# Patient Record
Sex: FEMALE | Race: WHITE | NOT HISPANIC OR LATINO | URBAN - NONMETROPOLITAN AREA
[De-identification: names, ages, dates, MRNs, and addresses within clinical notes are randomized per-mention and may not be internally consistent; named-entity substitution may affect disease eponyms.]

---

## 2020-05-08 ENCOUNTER — TRANSCRIBE ORDERS (OUTPATIENT)
Dept: ADMINISTRATIVE | Facility: HOSPITAL | Age: 45
End: 2020-05-08

## 2020-05-08 DIAGNOSIS — N92.1 METRORRHAGIA: Primary | ICD-10-CM

## 2020-05-15 ENCOUNTER — HOSPITAL ENCOUNTER (OUTPATIENT)
Dept: ULTRASOUND IMAGING | Facility: HOSPITAL | Age: 45
Discharge: HOME | End: 2020-05-15
Admitting: FAMILY MEDICINE

## 2020-05-15 DIAGNOSIS — N92.1 METRORRHAGIA: ICD-10-CM

## 2020-05-15 PROCEDURE — 76830 TRANSVAGINAL US NON-OB: CPT

## 2020-05-22 PROBLEM — N84.0 ENDOMETRIAL POLYP: Status: ACTIVE | Noted: 2020-05-22

## 2020-05-22 PROBLEM — Z01.419 WELL WOMAN EXAM: Status: ACTIVE | Noted: 2020-05-22

## 2020-05-22 PROBLEM — N83.201 RIGHT OVARIAN CYST: Status: ACTIVE | Noted: 2020-05-22

## 2020-07-08 ENCOUNTER — OFFICE VISIT (OUTPATIENT)
Dept: ENDOCRINOLOGY | Facility: CLINIC | Age: 45
End: 2020-07-08

## 2020-07-08 VITALS
SYSTOLIC BLOOD PRESSURE: 118 MMHG | OXYGEN SATURATION: 98 % | HEIGHT: 70 IN | BODY MASS INDEX: 24.94 KG/M2 | WEIGHT: 174.2 LBS | DIASTOLIC BLOOD PRESSURE: 72 MMHG | HEART RATE: 61 BPM

## 2020-07-08 DIAGNOSIS — R79.89 ELEVATED TSH: Primary | ICD-10-CM

## 2020-07-08 PROCEDURE — 99244 OFF/OP CNSLTJ NEW/EST MOD 40: CPT | Performed by: INTERNAL MEDICINE

## 2020-07-08 RX ORDER — FLUTICASONE PROPIONATE 50 MCG
2 SPRAY, SUSPENSION (ML) NASAL AS NEEDED
COMMUNITY

## 2020-07-08 RX ORDER — EPINEPHRINE 0.3 MG/.3ML
INJECTION INTRAMUSCULAR SEE ADMIN INSTRUCTIONS
COMMUNITY
Start: 2020-05-08

## 2020-07-08 RX ORDER — LEVOTHYROXINE SODIUM 0.05 MG/1
50 TABLET ORAL DAILY
Qty: 30 TABLET | Refills: 3 | Status: SHIPPED | OUTPATIENT
Start: 2020-07-08 | End: 2020-09-11 | Stop reason: SDUPTHER

## 2020-07-08 NOTE — PROGRESS NOTES
Chief Complaint   Patient presents with   • Establish Care   • Elevated TSH        New patient who is being seen in consultation regarding elevated TSH at the request of Gabby Short MD     HPI   Mitra García is a 45 y.o. female who presents for evaluation of elevated TSH.    Patient presents today for evaluation of elevated TSH which was diagnosed in May 2020 when patient presented for routine labs. Patient denies known history of thyroid dysfunction prior to this. Patient is not currently on medication for this.     Patient denies palpitations. She does report some anxiety.  Patient reports changes in bowel habits. Patient reports constipation.   Patient reports cold intolerance for most of her adult life.  Patient reports changes in weight. Patient reports she has been exercising without ability to lose weight.  Patient  hair, skin or nail changes.  Patient denies tremor.  Patient reports decreased energy level.    Patient denies history of previous head/neck radiation.  Patient denies history of recent iodine exposure.  Patient denies taking OTC supplements such as biotin.  Patient denies personal or family history of thyroid cancer.     Periods over the past few months with increased blood flow during menses with longer cycles.  Patient undergoing evaluation with GYN.    Past Medical History:   Diagnosis Date   • Arthritis    • Bilateral ovarian cysts    • Frequent UTI    • Thyroid disease      Past Surgical History:   Procedure Laterality Date   • KNEE ACL RECONSTRUCTION     • WISDOM TOOTH EXTRACTION        Family History   Problem Relation Age of Onset   • Arthritis Mother    • Mental illness Mother    • Obesity Mother    • Osteoporosis Mother    • Thyroid disease Mother    • Arthritis Father    • Cancer Father    • Cancer Brother    • Mental illness Brother    • Migraines Brother    • Cancer Maternal Aunt    • Obesity Maternal Aunt    • Arthritis Maternal Grandmother    • Hypertension Maternal  Grandmother    • Arthritis Maternal Grandfather    • Heart attack Maternal Grandfather    • Hypertension Maternal Grandfather    • Liver disease Maternal Grandfather    • Arthritis Paternal Grandmother    • Hypertension Paternal Grandmother    • Stroke Paternal Grandmother    • Arthritis Paternal Grandfather    • Hypertension Paternal Grandfather    • Stroke Paternal Grandfather       Social History     Socioeconomic History   • Marital status:      Spouse name: Not on file   • Number of children: Not on file   • Years of education: Not on file   • Highest education level: Not on file   Tobacco Use   • Smoking status: Never Smoker   • Smokeless tobacco: Never Used   Substance and Sexual Activity   • Alcohol use: Not Currently   • Drug use: Never   • Sexual activity: Defer      Allergies   Allergen Reactions   • Morphine Shortness Of Breath      Current Outpatient Medications on File Prior to Visit   Medication Sig Dispense Refill   • EPIPEN 2-NICO 0.3 MG/0.3ML solution auto-injector injection See Admin Instructions.     • fluticasone (FLONASE) 50 MCG/ACT nasal spray 2 sprays into the nostril(s) as directed by provider As Needed for Allergies.     • Multiple Vitamins-Minerals (MULTIVITAMIN ADULT PO) Take 1 tablet by mouth Daily.       No current facility-administered medications on file prior to visit.         Review of Systems   Constitutional: Positive for fatigue and unexpected weight gain.   HENT: Negative for trouble swallowing and voice change.    Eyes: Positive for visual disturbance. Negative for pain.   Respiratory: Negative for cough and shortness of breath.    Cardiovascular: Negative for chest pain and palpitations.   Gastrointestinal: Positive for constipation. Negative for diarrhea.   Endocrine: Positive for cold intolerance. Negative for heat intolerance.   Genitourinary: Positive for menstrual problem.   Musculoskeletal: Negative for arthralgias and myalgias.   Skin: Positive for dry skin.  "Negative for pallor.   Neurological: Negative for tremors and headache.   Psychiatric/Behavioral: Positive for sleep disturbance. The patient is nervous/anxious.        Vitals:    07/08/20 0842   BP: 118/72   Pulse: 61   SpO2: 98%   Weight: 79 kg (174 lb 3.2 oz)   Height: 177.8 cm (70\")   Body mass index is 25 kg/m².     Physical Exam   Constitutional: Vital signs are normal. She appears well-developed and well-nourished. She is cooperative.   HENT:   Head: Normocephalic and atraumatic.   Right Ear: Hearing normal.   Left Ear: Hearing normal.   Nose: Nose normal.   Eyes: Pupils are equal, round, and reactive to light. Conjunctivae and EOM are normal.   Neck: Trachea normal. No thyromegaly present.   Cardiovascular: Normal rate, regular rhythm and normal pulses.   No murmur heard.  Pulmonary/Chest: Effort normal and breath sounds normal. No respiratory distress.   Abdominal: Soft. Bowel sounds are normal. She exhibits no distension. There is no hepatosplenomegaly. There is no tenderness.   Lymphadenopathy:        Head (right side): No submandibular adenopathy present.        Head (left side): No submandibular adenopathy present.     She has no cervical adenopathy.   Neurological: She is alert. She has normal strength and normal reflexes.   Skin: Skin is warm, dry and intact. No rash noted.   Psychiatric: She has a normal mood and affect. Her behavior is normal.   Vitals reviewed.         Labs/Imaging  Labs dated 5/8/2020  Free T4 1.3, reference range 0.82-1.77  TSH 5.4, reference range 0.45-4.5  TPO antibody 413, reference range 0-30    Labs dated March 29, 2018  TSH 4.37    Labs dated May 19, 2014  TPO antibody 799    Labs dated February 1, 2017  Free T3 3.9  Free T4 1.17  TSH 2.72    Labs dated May 14, 2015  Free T3-4  Free T4 1.12  TSH 3.14    Labs dated May 19, 2014  Free T3 3.8  Free T4 1.25  Thyroglobulin antibody 141  TSH 3.69    Labs dated September 5, 2013  TSH 5.99    Labs dated April 3, 2009  Free T4 " 1.03  TSH 2.03    Assessment and Plan    Mitra was seen today for establish care and elevated tsh.    Diagnoses and all orders for this visit:    Elevated TSH  -Patient with intermittent elevation in TSH, normal free hormones over the last several years, consistent with subclinical hyperthyroidism  -TPO antibodies elevated  -Discussed with patient that her symptoms are nonspecific and the possibility that they may not improve with thyroid hormone.   - discussed potential side effects of levothyroxine, specifically of hyperthyroidism  - will plan to start levothyroxine 50 mcg daily  - appropriate administration of medication reviewed  - discussed symptoms of both hyperthyroidism and hypothyroidism in detail, patient to contact the clinic in the interim between appointments with any concerning changes  - patient given lab orders to repeat TFTs in 2 months  -     TSH; Future  -     T4, Free; Future  -     levothyroxine (Synthroid) 50 MCG tablet; Take 1 tablet by mouth Daily. On empty stomach    Patient was given orders for labs to be completed outside of [facility]. Patient instructed to contact clinic if they have not heard from this office regarding results within 1 week of lab draw to ensure that results were received by this office.    Return in about 4 months (around 11/8/2020) for Next scheduled follow up. The patient was instructed to contact the clinic with any interval questions or concerns.    Chelo Colmenares MD     Please note that portions of this document were completed using a voice recognition program. Efforts were made to edit the dictations, but occasionally words are mis-transcribed.

## 2020-08-19 ENCOUNTER — CLINICAL SUPPORT (OUTPATIENT)
Dept: GENETICS | Facility: HOSPITAL | Age: 45
End: 2020-08-19

## 2020-08-19 DIAGNOSIS — Z80.49 FAMILY HISTORY OF UTERINE CANCER: ICD-10-CM

## 2020-08-19 DIAGNOSIS — Z80.3 FAMILY HISTORY OF BREAST CANCER: Primary | ICD-10-CM

## 2020-08-21 NOTE — PROGRESS NOTES
Addendum: 2/15/22:  Saliva sample was not sent back to [facility] and genetic testing has not been completed at this time.  Patient is welcome to contact our office if she would like to proceed with testing in the future.        Mitra García is a 45-year-old female who was referred for genetic counseling due to a family history of colon cancer. Genetic counseling was provided via telephone.  Ms. García has no personal history of cancer. She is premenopausal and retains her uterus and ovaries.  She has not had a colonoscopy.  She was interested in discussing her risk for a hereditary cancer syndrome. Ms. Friends was interested in pursuing a multi-gene panel, and therefore the CancerNext panel was ordered through [facility] which analyzes 36 genes associated with an increased cancer risk. The genes on this panel include APC, TANIKA, AXIN2, BARD1, BMPR1A, BRCA1, BRCA2, BRIP1, CDH1, CDK4, CDKN2A, CHEK2, DICER1, EPCAM, GREM1, HOXB13, MLH1, MRE11A, MSH2, MSH3, MSH6, MUTYH, NBN, NF1, NTHL1, PALB2, PMS2, POLD1, POLE, PTEN, RAD51C, RAD51D, RECQL, SMAD4, SMARCA4, STK11, and TP53. A saliva sample collection kit is being mailed to Ms. García. Results from this testing are expected in approximately 2-3 weeks.    FAMILY HISTORY (see attached pedigree):    Mother:  Uterine cancer, 46  Mat. Uncle:  Glioblastoma  Mat. Aunt:  Breast cancer, 66  Mat. Great-Aunt: Breast cancer, 70s  Mat. Grandmother: Leukemia  Father:   Prostate cancer, 65  Brother:  Wilms tumor, 3      We do not have medical records confirming the diagnoses in Ms. Friends’ family.  RISK ASSESSMENT: Ms. Friends’ family history led to concern regarding a hereditary cancer syndrome. Testing for Alvarenga syndrome testing was discussed given the history of her mother with early onset uterine cancer. In cases where an affected relative is not available for testing, it is appropriate to offer testing to an unaffected individual. Ms. García opted to pursue multigene  panel testing via the CancerNext panel. This would also allow for evaluation of the BRCA1/2 genes and a number of other hereditary cancer genes. If genetic testing is negative, Ms. García’ management should be guided by family history. These risk assessments are based on the family history information provided at the time of the appointment and could change in the future should new information be obtained.    GENETIC COUNSELING:  We reviewed the family history information in detail. Cases of cancer follow three general patterns: sporadic, familial, and hereditary.  While most cancer is sporadic, some cases appear to occur in family clusters.  These cases are said to be familial and account for 10-20% of cancer cases.  Familial cases may be due to a combination of shared genes and environmental factors among family members.  In even fewer families, the cancer is said to be inherited, and the genes responsible for the cancer are known.      Family histories typical of hereditary cancer syndromes usually include multiple first- and second-degree relatives diagnosed with cancer types that define a syndrome. These cases tend to be diagnosed at younger-than-expected ages and can be bilateral or multifocal. The cancer in these families follows an autosomal dominant inheritance pattern, which indicates the likely presence of a mutation in a cancer susceptibility gene. Children and siblings of an individual believed to carry this mutation have a 50% chance of inheriting that mutation, thereby inheriting the increased risk to develop cancer. These mutations can be passed down from the maternal or the paternal lineage.    We discussed that Alvarenga syndrome is the most common condition related to a hereditary risk for uterine cancer.  Alvarenga syndrome is a hereditary colon cancer syndrome that is caused by mutations in the mismatch repair genes. The lifetime risk for colon cancer for individuals with Alvarenga syndrome is as high as  80% if there is no intervention (i.e. removal of polyps detected on colonoscopy).  Other risks include endometrial cancer (up to 60%), as well as other cancers (ovarian, upper GI, brain, stomach, pancreatic). There are national management guidelines that have been established for individuals known to have Alvarenga syndrome.      We discussed there are other genes associated with increased cancer risk. Some of these conditions have well defined cancer risks and established management guidelines.  We discussed the BRCA1/2 genes based on the family history of breast cancer.  Other genes that can be tested for have been more recently described, and there may be less data regarding the risks and therefore may not have established management guidelines.  We discussed these limitations at length. Based on Ms. García’ family history of cancer and her desire to get more information regarding her personal risks she opted to pursue testing through a panel evaluating several other genes known to increase the risk for cancer.    GENETIC TESTING:  The risks, benefits and limitations of genetic testing and implications for clinical management following testing were reviewed. DNA test results can influence decisions regarding screening and prevention.  Genetic testing can have significant psychological implications for both individuals and families. Also discussed was the possibility of employment and insurance discrimination based on genetic test results and the federal and states laws that are in place to prevent this ([place]).         We discussed panel testing, which would involve testing 36 genes associated with increased cancer risk, including the genes associated with Alvarenga syndrome and the BRCA1/2 genes. The benefits and limitations of genetic testing were discussed. The implications of a positive or negative test result were discussed. We also discussed the importance of testing on an affected relative. We discussed the  possibility that, in some cases, genetic test results may be ambiguous due to the identification of a genetic variant. These variants may or may not be associated with an increased cancer risk. With multigene panel testing, it is not uncommon for a variant of uncertain significance (VUS) to be identified.  If a VUS is identified, testing family members is not recommended and screening recommendations are made based on the family history. The laboratories that perform genetic testing work to reclassify the VUS and send out an amended report if and when a VUS is reclassified.  The majority of variant findings are ultimately reclassified to a negative result. Given her family history, a negative test result does not eliminate all cancer risk, although the risk would not be as high as it would with positive genetic testing. We also discussed that some of the genes on this particular panel have not been well studied yet and there may not be clear implications or guidelines for some of the genes included on this comprehensive panel.    PLAN:  Genetic testing via the CancerNext panel through [facility] was ordered and results are expected in 2-3 weeks. We will contact Ms. Ricky with her results once they are received.      Vianney Camara MS, [facility] [place]  Licensed Certified Genetic Counselor

## 2020-09-09 DIAGNOSIS — R79.89 ELEVATED TSH: ICD-10-CM

## 2020-09-10 LAB
T4 FREE SERPL-MCNC: 1.78 NG/DL (ref 0.82–1.77)
TSH SERPL DL<=0.005 MIU/L-ACNC: 1.61 UIU/ML (ref 0.45–4.5)

## 2020-09-11 RX ORDER — LEVOTHYROXINE SODIUM 0.05 MG/1
50 TABLET ORAL DAILY
Qty: 30 TABLET | Refills: 5 | Status: SHIPPED | OUTPATIENT
Start: 2020-09-11 | End: 2020-11-12

## 2020-11-09 ENCOUNTER — OFFICE VISIT (OUTPATIENT)
Dept: ENDOCRINOLOGY | Facility: CLINIC | Age: 45
End: 2020-11-09

## 2020-11-09 VITALS
SYSTOLIC BLOOD PRESSURE: 118 MMHG | BODY MASS INDEX: 25.91 KG/M2 | HEART RATE: 54 BPM | DIASTOLIC BLOOD PRESSURE: 68 MMHG | OXYGEN SATURATION: 100 % | WEIGHT: 181 LBS | HEIGHT: 70 IN

## 2020-11-09 DIAGNOSIS — R79.89 ELEVATED TSH: Primary | ICD-10-CM

## 2020-11-09 PROCEDURE — 99213 OFFICE O/P EST LOW 20 MIN: CPT | Performed by: INTERNAL MEDICINE

## 2020-11-09 NOTE — PROGRESS NOTES
"Chief Complaint   Patient presents with   • Follow-up   • Elevated TSH        HPI   Mitra García is a 45 y.o. female had concerns including Follow-up and Elevated TSH.      Patient reports no significant changes in her health or symptomatic concerns in the interim since her last visit other than eyelid swelling which is new.  She does report a history of allergies and is going to be seeing an allergist in the near future.  She reports continuation of itchy/dry skin with development of hives on occasion.  She also reports weight gain of approximately 11 pounds, constipation and continued anxiety.  She does report that she believes a portion of her weight gain is attributed to muscle she is started working out at [facility]. She reports that none of the symptoms are changed significantly since her last visit.    Patient continues on levothyroxine 50 mcg daily for subclinical hypothyroidism.  She denies any issues with this medication.  She does report potentially a single missed dose since her last visit.  She takes this first thing in the morning on empty stomach.  She denies any worsening anxiety.  She reports continued constipation, weight gain as above.    The following portions of the patient's history were reviewed and updated as appropriate: allergies, current medications and past social history.    Review of Systems   Constitutional: Positive for fatigue and unexpected weight gain.   Cardiovascular: Negative for chest pain and palpitations.   Gastrointestinal: Positive for constipation. Negative for abdominal pain.   Psychiatric/Behavioral: The patient is nervous/anxious.         /68   Pulse 54   Ht 177.8 cm (70\")   Wt 82.1 kg (181 lb)   SpO2 100%   BMI 25.97 kg/m²      Physical Exam      Constitutional:  well developed; well nourished  no acute distress   ENT/Thyroid: not examined   Eyes: Conjunctiva: clear   Respiratory:  breathing is unlabored  clear to auscultation bilaterally "   Cardiovascular:  regular rate and rhythm   Chest:  Not performed.   Abdomen: soft, non-tender; no masses   : Not performed.   Musculoskeletal: Not performed   Skin: not performed.   Neuro: normal without focal findings and mental status, speech normal   Psych: mood and affect are within normal limits       Labs/Imaging  Results for CANDI HILL (MRN [id number]) as of 11/9/2020 10:56   Ref. Range 9/9/2020 09:19   TSH Baseline Latest Ref Range: 0.450 - 4.500 uIU/mL 1.610   Free T4 Latest Ref Range: 0.82 - 1.77 ng/dL 1.78 (H)       Diagnoses and all orders for this visit:    1. Elevated TSH (Primary)  -Patient with subclinical hypothyroidism  -Patient currently taking levothyroxine 50 mcg daily, TSH 1.61 in September 2020  -Reviewed appropriate administration of medication.  -Reviewed the patient's symptomatic concerns, discussed that symptoms are nonspecific and could have multiple potential contributing factors.  -Discussed with patient for continued symptoms in the setting of a normal TSH suggest her symptoms are likely multifactorial and are not likely to resolve completely with titration of thyroid hormone.  However, if she desires addition of a low-dose of T3 or increasing levothyroxine we can trial this so long as her TSH does not become suppressed.  Patient denies any plans for future pregnancy, any coronary artery disease or history of cardiac arrhythmias.  -We will repeat thyroid function testing today  -Could consider slight increase in levothyroxine versus addition of T3 should TSH allow.  -     TSH  -     T4, Free      Return in about 4 months (around 3/9/2021). The patient was instructed to contact the clinic with any interval questions or concerns.    Chelo Colmenares MD   Endocrinologist    Please note that portions of this document were completed with a voice recognition program. Efforts were made to edit the dictations, but occasionally words are mis-transcribed.

## 2020-11-12 DIAGNOSIS — R79.89 ELEVATED TSH: ICD-10-CM

## 2020-11-12 RX ORDER — LEVOTHYROXINE SODIUM 0.05 MG/1
TABLET ORAL
Qty: 26 TABLET | Refills: 5 | Status: SHIPPED | OUTPATIENT
Start: 2020-11-12 | End: 2020-12-24

## 2020-11-20 DIAGNOSIS — R79.89 ELEVATED TSH: Primary | ICD-10-CM

## 2020-12-24 DIAGNOSIS — R79.89 ELEVATED TSH: ICD-10-CM

## 2020-12-24 RX ORDER — LEVOTHYROXINE SODIUM 0.03 MG/1
TABLET ORAL
Qty: 30 TABLET | Refills: 5 | Status: SHIPPED | OUTPATIENT
Start: 2020-12-24 | End: 2021-04-01 | Stop reason: SDUPTHER

## 2021-03-09 ENCOUNTER — OFFICE VISIT (OUTPATIENT)
Dept: ENDOCRINOLOGY | Facility: CLINIC | Age: 46
End: 2021-03-09

## 2021-03-09 VITALS
DIASTOLIC BLOOD PRESSURE: 72 MMHG | WEIGHT: 177 LBS | SYSTOLIC BLOOD PRESSURE: 124 MMHG | HEIGHT: 70 IN | BODY MASS INDEX: 25.34 KG/M2 | HEART RATE: 68 BPM | OXYGEN SATURATION: 98 %

## 2021-03-09 DIAGNOSIS — R76.8 ANTI-TPO ANTIBODIES PRESENT: ICD-10-CM

## 2021-03-09 DIAGNOSIS — R79.89 ELEVATED TSH: Primary | ICD-10-CM

## 2021-03-09 PROCEDURE — 99214 OFFICE O/P EST MOD 30 MIN: CPT | Performed by: INTERNAL MEDICINE

## 2021-03-09 NOTE — PROGRESS NOTES
"Chief Complaint   Patient presents with   • Follow-up   • Elevated TSH        HPI   Mitra García is a 45 y.o. female had concerns including Follow-up and Elevated TSH.      Levothyroxine dose reduced in the interim since last visit. Patient currently taking 25 mcg daily. She does report intermittent heat intolerance. She has had some intentional weight gain. She denies any palpitations or concerning changes in bowel habits. She has been taking her biotin supplementation but is agreeable to holding off and repeating labs in 1 week.    The following portions of the patient's history were reviewed and updated as appropriate: allergies, current medications and past social history.    Review of Systems   Respiratory: Negative for cough.    Cardiovascular: Negative for palpitations.   Gastrointestinal: Negative for constipation and diarrhea.   Endocrine: Positive for heat intolerance.      /72   Pulse 68   Ht 177.8 cm (70\")   Wt 80.3 kg (177 lb)   SpO2 98%   BMI 25.40 kg/m²      Physical Exam      Constitutional:  well developed; well nourished  no acute distress  appears stated age   ENT/Thyroid: not examined   Eyes: Conjunctiva: clear   Respiratory:  breathing is unlabored  clear to auscultation bilaterally   Cardiovascular:  regular rate and rhythm   Chest:  Not performed.   Abdomen: soft, non-tender; no masses   : Not performed.   Musculoskeletal: Not performed   Skin: dry and warm   Neuro: mental status, speech normal   Psych: mood and affect are within normal limits       Labs/Imaging  Labs dated 12/22/2020  TSH 0.02  Free T4 1.75    Diagnoses and all orders for this visit:    1. Elevated TSH (Primary)  -Started on levothyroxine in July 2020 due to subclinical hypothyroidism with symptoms  -Patient has recently required reduction in thyroid hormone secondary to iatrogenic hyperthyroidism  -Patient currently taking levothyroxine 25 mcg daily, most recent TSH obtained between visits 0.02, not at " goal, patient reports heat intolerance  -Continue levothyroxine 25 mcg daily for now, repeat thyroid function tests in 1 week after patient held biotin supplementation, patient given orders to complete locally  -Reviewed symptoms of both hypothyroidism and hyperthyroidism instructed the patient to contact the clinic in the interim between visits with any concerning changes.  -Discussed options of continuation of thyroid hormone versus discontinuation if TSH normal. Given elevated TPO antibodies, would lean towards continuation of thyroid hormone at low-dose should TSH allow.  -     TSH; Future  -     T4, Free; Future    2. Anti-TPO antibodies present  -Elevated on testing in May 2020  -Discussed that TPO antibodies are a risk factor for development of overt hypothyroidism in the future. Discussed that patient should have at least annual thyroid testing even if asymptomatic.      Return in about 4 months (around 7/9/2021) for Next scheduled follow up. The patient was instructed to contact the clinic with any interval questions or concerns.    Chelo Colmenares MD   Endocrinologist    Please note that portions of this document were completed with a voice recognition program. Efforts were made to edit the dictations, but occasionally words are mis-transcribed.

## 2021-03-30 DIAGNOSIS — R79.89 ELEVATED TSH: ICD-10-CM

## 2021-03-31 LAB
T4 FREE SERPL-MCNC: 1.18 NG/DL (ref 0.82–1.77)
TSH SERPL DL<=0.005 MIU/L-ACNC: 7.99 UIU/ML (ref 0.45–4.5)

## 2021-04-01 RX ORDER — LEVOTHYROXINE SODIUM 0.03 MG/1
TABLET ORAL
Qty: 45 TABLET | Refills: 5 | Status: SHIPPED | OUTPATIENT
Start: 2021-04-01 | End: 2021-06-25

## 2021-06-25 DIAGNOSIS — R79.89 ELEVATED TSH: ICD-10-CM

## 2021-06-25 RX ORDER — LEVOTHYROXINE SODIUM 0.03 MG/1
TABLET ORAL
Qty: 45 TABLET | Refills: 5 | Status: SHIPPED | OUTPATIENT
Start: 2021-06-25 | End: 2021-07-12

## 2021-07-09 ENCOUNTER — OFFICE VISIT (OUTPATIENT)
Dept: ENDOCRINOLOGY | Facility: CLINIC | Age: 46
End: 2021-07-09

## 2021-07-09 VITALS
HEIGHT: 70 IN | DIASTOLIC BLOOD PRESSURE: 78 MMHG | OXYGEN SATURATION: 98 % | WEIGHT: 177.8 LBS | HEART RATE: 63 BPM | BODY MASS INDEX: 25.45 KG/M2 | SYSTOLIC BLOOD PRESSURE: 122 MMHG

## 2021-07-09 DIAGNOSIS — R79.89 ELEVATED TSH: ICD-10-CM

## 2021-07-09 DIAGNOSIS — E03.9 HYPOTHYROIDISM, UNSPECIFIED TYPE: Primary | ICD-10-CM

## 2021-07-09 PROCEDURE — 99213 OFFICE O/P EST LOW 20 MIN: CPT | Performed by: INTERNAL MEDICINE

## 2021-07-09 NOTE — PROGRESS NOTES
"Chief Complaint   Patient presents with   • Follow-up   • Elevated TSH        HPI   Candi Hill is a 46 y.o. female had concerns including Follow-up and Elevated TSH.      Patient reports no significant health changes in the interim since her last visit.  She is currently alternating levothyroxine 25 and 50 mcg daily.  She denies any significant weight changes, heat or cold intolerance.  She does report some intermittent constipation but states this is not significantly changed from previous.  She denies any missed doses of medications.    The following portions of the patient's history were reviewed and updated as appropriate: allergies, current medications and past medical history.    Review of Systems   Gastrointestinal: Positive for constipation. Negative for diarrhea.   Endocrine: Negative for cold intolerance and heat intolerance.       /78   Pulse 63   Ht 177.8 cm (70\")   Wt 80.6 kg (177 lb 12.8 oz)   SpO2 98%   BMI 25.51 kg/m²      Physical Exam      Constitutional:  well developed; well nourished  no acute distress  appears stated age   ENT/Thyroid: not examined   Eyes: Conjunctiva: clear   Respiratory:  breathing is unlabored  clear to auscultation bilaterally   Cardiovascular:  regular rate and rhythm   Chest:  Not performed.   Abdomen: soft, non-tender; no masses   : Not performed.   Musculoskeletal: Not performed   Skin: dry and warm   Neuro: mental status, speech normal   Psych: mood and affect are within normal limits       Labs/Imaging  Results for CANDI HILL (MRN [id number]) as of 7/9/2021 08:32   Ref. Range 3/30/2021 09:26   TSH Baseline Latest Ref Range: 0.450 - 4.500 uIU/mL 7.990 (H)   Free T4 Latest Ref Range: 0.82 - 1.77 ng/dL 1.18       Diagnoses and all orders for this visit:    1. Hypothyroidism, unspecified type (Primary)  -Currently taking alternating doses of 25 mcg and 50 mcg daily  -Clinically euthyroid  -Appropriate administration and storage of thyroid " hormone was reviewed  -Plan to repeat labs and adjust levothyroxine as clinically indicated  -Symptoms of both hypothyroidism and hyperthyroidism were reviewed with patient in detail and she was instructed to contact the clinic in interim between visits with any concerning changes.  -     TSH; Future  -     T4, Free; Future      Return in about 3 months (around 10/9/2021). The patient was instructed to contact the clinic with any interval questions or concerns.    Chelo Colmenares MD   Endocrinologist    Please note that portions of this document were completed with a voice recognition program. Efforts were made to edit the dictations, but occasionally words are mis-transcribed.

## 2021-07-10 LAB
T4 FREE SERPL-MCNC: 1.45 NG/DL (ref 0.82–1.77)
TSH SERPL DL<=0.005 MIU/L-ACNC: 5.59 UIU/ML (ref 0.45–4.5)

## 2021-07-12 RX ORDER — LEVOTHYROXINE SODIUM 0.05 MG/1
50 TABLET ORAL DAILY
Qty: 30 TABLET | Refills: 5 | Status: SHIPPED | OUTPATIENT
Start: 2021-07-12 | End: 2021-11-29

## 2021-10-19 ENCOUNTER — OFFICE VISIT (OUTPATIENT)
Dept: ENDOCRINOLOGY | Facility: CLINIC | Age: 46
End: 2021-10-19

## 2021-10-19 VITALS
BODY MASS INDEX: 26.05 KG/M2 | OXYGEN SATURATION: 100 % | HEART RATE: 48 BPM | HEIGHT: 70 IN | SYSTOLIC BLOOD PRESSURE: 124 MMHG | DIASTOLIC BLOOD PRESSURE: 74 MMHG | WEIGHT: 182 LBS

## 2021-10-19 DIAGNOSIS — E03.9 HYPOTHYROIDISM, UNSPECIFIED TYPE: Primary | ICD-10-CM

## 2021-10-19 PROCEDURE — 99213 OFFICE O/P EST LOW 20 MIN: CPT | Performed by: INTERNAL MEDICINE

## 2021-10-19 NOTE — PROGRESS NOTES
"Chief Complaint   Patient presents with   • Hypothyroidism        HPI   Candi Hill is a 46 y.o. female had concerns including Hypothyroidism.      Patient denies any significant health changes in the interim since her last visit.  She reports continued frustrations regarding inability to lose weight.  She does report that she continues to diet and exercise regularly.  She does report that she feels she has regained some muscle tone.  She denies any palpitations, diarrhea, constipation, heat or cold intolerance.  She is currently taking levothyroxine 50 mcg daily.    The following portions of the patient's history were reviewed and updated as appropriate: allergies, current medications and past social history.    Review of Systems   Constitutional: Positive for unexpected weight gain. Negative for fatigue.   Cardiovascular: Negative for palpitations.   Gastrointestinal: Negative for constipation and diarrhea.        /74 (BP Location: Right arm, Patient Position: Sitting, Cuff Size: Adult)   Pulse (!) 48   Ht 177.8 cm (70\")   Wt 82.6 kg (182 lb)   SpO2 100%   BMI 26.11 kg/m²    Manual pulse approximately 55, patient wearing acrylic nails    Physical Exam      Constitutional:  well developed; well nourished  no acute distress  appears stated age   ENT/Thyroid: no thyromegaly   Eyes: Conjunctiva: clear   Respiratory:  breathing is unlabored  clear to auscultation bilaterally   Cardiovascular:  regular rate and rhythm   Chest:  Not performed.   Abdomen: soft, non-tender; no masses   : Not performed.   Musculoskeletal: Not performed   Skin: dry and warm   Neuro: mental status, speech normal   Psych: mood and affect are within normal limits       Labs/Imaging  Results for CANDI HILL (MRN [id number]) as of 10/19/2021 08:43   Ref. Range 7/9/2021 09:23   TSH Baseline Latest Ref Range: 0.450 - 4.500 uIU/mL 5.590 (H)   Free T4 Latest Ref Range: 0.82 - 1.77 ng/dL 1.45       Diagnoses and all orders " for this visit:    1. Hypothyroidism, unspecified type (Primary)  -Most recent TSH above goal, levothyroxine increased to 50 mcg daily following this  -Patient's primary concern is inability to lose weight, otherwise clinically euthyroid  -Discussed other potential options for thyroid hormone replacement including addition of synthetic T3 versus desiccated thyroid hormone.  Patient denies any plans for pregnancy, any history of arrhythmia or coronary artery disease.  We did discuss potential cardiac stimulation with any T3-containing therapy.  Patient is potentially interested in adding T3 pending repeat labs.  -Continue levothyroxine 50 mcg daily for now  -Determine next step after review of labs  -     TSH; Future  -     T4, Free; Future      Return in about 3 months (around 1/19/2022). The patient was instructed to contact the clinic with any interval questions or concerns.    Chelo Colmenares MD   Endocrinologist    Please note that portions of this document were completed with a voice recognition program. Efforts were made to edit the dictations, but occasionally words are mis-transcribed.

## 2021-10-20 LAB
T4 FREE SERPL-MCNC: 1.45 NG/DL (ref 0.82–1.77)
TSH SERPL DL<=0.005 MIU/L-ACNC: 4.67 UIU/ML (ref 0.45–4.5)

## 2021-10-21 DIAGNOSIS — E03.9 HYPOTHYROIDISM, UNSPECIFIED TYPE: Primary | ICD-10-CM

## 2021-10-21 RX ORDER — LIOTHYRONINE SODIUM 5 UG/1
5 TABLET ORAL DAILY
Qty: 30 TABLET | Refills: 3 | Status: SHIPPED | OUTPATIENT
Start: 2021-10-21 | End: 2022-01-27

## 2021-11-28 DIAGNOSIS — R79.89 ELEVATED TSH: ICD-10-CM

## 2021-11-29 RX ORDER — LEVOTHYROXINE SODIUM 0.05 MG/1
TABLET ORAL
Qty: 78 TABLET | Refills: 1 | Status: SHIPPED | OUTPATIENT
Start: 2021-11-29 | End: 2022-01-27

## 2022-01-24 ENCOUNTER — TELEPHONE (OUTPATIENT)
Dept: ENDOCRINOLOGY | Facility: CLINIC | Age: 47
End: 2022-01-24

## 2022-01-24 DIAGNOSIS — E03.9 HYPOTHYROIDISM, UNSPECIFIED TYPE: ICD-10-CM

## 2022-01-24 DIAGNOSIS — R79.89 ELEVATED TSH: Primary | ICD-10-CM

## 2022-01-24 NOTE — TELEPHONE ENCOUNTER
Called pt and she is going to [employer] in [facility] phone number is [phone] and fax number [phone]. Orders faxed.

## 2022-01-24 NOTE — TELEPHONE ENCOUNTER
----- Message from Chelo Colmenares MD sent at 1/24/2022 11:40 AM EST -----  Regarding: FW: Labs?      ----- Message -----  From: Yamel Kurtz MA  Sent: 1/24/2022  11:37 AM EST  To: Chelo Colmenares MD  Subject: FW: Labs?                                        See message  ----- Message -----  From: Mitra García  Sent: 1/24/2022  10:50 AM EST  To: paty Alfaro [facility] [place]  Subject: Labs?                                            Flynn Wang!  Last time we talked about me getting my labs drawn before my appointment since I have to use [facility]. So my appointment is Thursday afternoon. If you could please send me my lab orders I could get them drawn tomorrow morning.    Hope you are well,  K

## 2022-01-26 LAB
T4 FREE SERPL-MCNC: 1.47 NG/DL (ref 0.82–1.77)
TSH SERPL-ACNC: 1.06 UIU/ML (ref 0.45–4.5)

## 2022-01-27 ENCOUNTER — OFFICE VISIT (OUTPATIENT)
Dept: ENDOCRINOLOGY | Facility: CLINIC | Age: 47
End: 2022-01-27

## 2022-01-27 VITALS
SYSTOLIC BLOOD PRESSURE: 120 MMHG | BODY MASS INDEX: 26.92 KG/M2 | HEIGHT: 70 IN | OXYGEN SATURATION: 98 % | HEART RATE: 66 BPM | DIASTOLIC BLOOD PRESSURE: 60 MMHG | WEIGHT: 188 LBS

## 2022-01-27 DIAGNOSIS — E03.9 HYPOTHYROIDISM, UNSPECIFIED TYPE: Primary | ICD-10-CM

## 2022-01-27 PROCEDURE — 99213 OFFICE O/P EST LOW 20 MIN: CPT | Performed by: INTERNAL MEDICINE

## 2022-01-27 RX ORDER — LIOTHYRONINE SODIUM 5 UG/1
5 TABLET ORAL DAILY
Qty: 90 TABLET | Refills: 1 | Status: SHIPPED | OUTPATIENT
Start: 2022-01-27 | End: 2022-08-16

## 2022-01-27 RX ORDER — LEVOTHYROXINE SODIUM 0.05 MG/1
50 TABLET ORAL DAILY
Qty: 90 TABLET | Refills: 1 | Status: SHIPPED | OUTPATIENT
Start: 2022-01-27 | End: 2023-01-03 | Stop reason: SDUPTHER

## 2022-01-27 NOTE — PROGRESS NOTES
"Chief Complaint   Patient presents with   • Hypothyroidism        HPI   Candi Hill is a 46 y.o. female had concerns including Hypothyroidism.     Patient denies any significant health changes in the interim since her last visit.  She does report increased stress at work as they are currently understaffed.  She does report that she feels like she has been more irritable recently.  She also reports fatigue but states she has not been sleeping well.  She did complete labs prior to appointment.  She is currently taking levothyroxine 50 mcg daily and liothyronine 5 mcg daily.  She denies missed doses of medication.    The following portions of the patient's history were reviewed and updated as appropriate: allergies, current medications and past social history.    Review of Systems   Constitutional: Positive for fatigue. Negative for unexpected weight gain.   Cardiovascular: Negative for palpitations.   Gastrointestinal: Negative for constipation and diarrhea.   Endocrine: Negative for cold intolerance and heat intolerance.   Psychiatric/Behavioral: Positive for agitation.      /60 (BP Location: Right arm, Patient Position: Sitting, Cuff Size: Adult)   Pulse 66   Ht 177.8 cm (70\")   Wt 85.3 kg (188 lb)   SpO2 98%   BMI 26.98 kg/m²      Physical Exam      Constitutional:  well developed; well nourished  no acute distress  appears stated age   ENT/Thyroid: no thyromegaly   Eyes: Conjunctiva: clear   Respiratory:  breathing is unlabored  clear to auscultation bilaterally   Cardiovascular:  regular rate and rhythm   Chest:  Not performed.   Abdomen: soft, non-tender; no masses   : Not performed.   Musculoskeletal: Not performed   Skin: dry and warm   Neuro: mental status, speech normal   Psych: mood and affect are within normal limits       Labs/Imaging  Results for CANDI HILL (MRN [id number]) as of 1/27/2022 15:43   Ref. Range 1/25/2022 10:56   TSH Baseline Latest Ref Range: 0.450 - 4.500 uIU/mL " 1.060   Free T4 Latest Ref Range: 0.82 - 1.77 ng/dL 1.47       Diagnoses and all orders for this visit:    1. Hypothyroidism, unspecified type (Primary)  -Thyroid function testing on January 25 with normal TSH, normal free T4  -Patient currently taking levothyroxine 50 mcg daily, liothyronine 5 mcg daily  -Reviewed with patient that in the setting of normal labs, is difficult to attribute her symptomatic concerns to thyroid hormone dysregulation.  She is aware that the symptoms are nonspecific.  Given this, will defer any changes to thyroid hormone at this time.  -Symptoms of both hypothyroidism and hyperthyroidism were reviewed with patient in detail she was instructed to contact clinic in the interim between visits with any concerning changes.  -     levothyroxine (SYNTHROID, LEVOTHROID) 50 MCG tablet; Take 1 tablet by mouth Daily.  Dispense: 90 tablet; Refill: 1  -     liothyronine (CYTOMEL) 5 MCG tablet; Take 1 tablet by mouth Daily.  Dispense: 90 tablet; Refill: 1  -     TSH; Future  -     T4, Free; Future      Return in about 5 months (around 6/27/2022) for Next scheduled follow up. The patient was instructed to contact the clinic with any interval questions or concerns.    Chelo Colmenares MD   Endocrinologist    Dictated Utilizing Dragon Dictation

## 2022-06-13 ENCOUNTER — TRANSCRIBE ORDERS (OUTPATIENT)
Dept: ADMINISTRATIVE | Facility: HOSPITAL | Age: 47
End: 2022-06-13

## 2022-06-13 DIAGNOSIS — Z12.31 SCREENING MAMMOGRAM FOR BREAST CANCER: Primary | ICD-10-CM

## 2022-06-18 LAB
25(OH)D3+25(OH)D2 SERPL-MCNC: 77.4 NG/ML (ref 30–100)
ALBUMIN SERPL-MCNC: 4.4 G/DL (ref 3.8–4.8)
ALBUMIN/GLOB SERPL: 1.5 {RATIO} (ref 1.2–2.2)
ALP SERPL-CCNC: 55 IU/L (ref 44–121)
ALT SERPL-CCNC: 13 IU/L (ref 0–32)
AMBIG ABBREV CMP14 DEFAULT: NORMAL
AMBIG ABBREV LP DEFAULT: NORMAL
AST SERPL-CCNC: 20 IU/L (ref 0–40)
BILIRUB SERPL-MCNC: 0.8 MG/DL (ref 0–1.2)
BUN SERPL-MCNC: 18 MG/DL (ref 6–24)
BUN/CREAT SERPL: 16 (ref 9–23)
CALCIUM SERPL-MCNC: 9.6 MG/DL (ref 8.7–10.2)
CHLORIDE SERPL-SCNC: 101 MMOL/L (ref 96–106)
CHOLEST SERPL-MCNC: 167 MG/DL (ref 100–199)
CO2 SERPL-SCNC: 25 MMOL/L (ref 20–29)
CREAT SERPL-MCNC: 1.11 MG/DL (ref 0.57–1)
EGFRCR SERPLBLD CKD-EPI 2021: 61 ML/MIN/1.73
ERYTHROCYTE [DISTWIDTH] IN BLOOD BY AUTOMATED COUNT: 12.1 % (ref 11.7–15.4)
GLOBULIN SER CALC-MCNC: 2.9 G/DL (ref 1.5–4.5)
GLUCOSE SERPL-MCNC: 89 MG/DL (ref 65–99)
HCT VFR BLD AUTO: 42 % (ref 34–46.6)
HDLC SERPL-MCNC: 61 MG/DL
HGB BLD-MCNC: 14.3 G/DL (ref 11.1–15.9)
LDLC SERPL CALC-MCNC: 86 MG/DL (ref 0–99)
MCH RBC QN AUTO: 31 PG (ref 26.6–33)
MCHC RBC AUTO-ENTMCNC: 34 G/DL (ref 31.5–35.7)
MCV RBC AUTO: 91 FL (ref 79–97)
PLATELET # BLD AUTO: 245 X10E3/UL (ref 150–450)
POTASSIUM SERPL-SCNC: 4.9 MMOL/L (ref 3.5–5.2)
PROT SERPL-MCNC: 7.3 G/DL (ref 6–8.5)
RBC # BLD AUTO: 4.62 X10E6/UL (ref 3.77–5.28)
SODIUM SERPL-SCNC: 140 MMOL/L (ref 134–144)
T4 FREE SERPL-MCNC: 1.3 NG/DL (ref 0.82–1.77)
TRIGL SERPL-MCNC: 113 MG/DL (ref 0–149)
TSH SERPL DL<=0.005 MIU/L-ACNC: 5.58 UIU/ML (ref 0.45–4.5)
VLDLC SERPL CALC-MCNC: 20 MG/DL (ref 5–40)
WBC # BLD AUTO: 5.2 X10E3/UL (ref 3.4–10.8)

## 2022-06-27 ENCOUNTER — OFFICE VISIT (OUTPATIENT)
Dept: ENDOCRINOLOGY | Facility: CLINIC | Age: 47
End: 2022-06-27

## 2022-06-27 VITALS
OXYGEN SATURATION: 98 % | SYSTOLIC BLOOD PRESSURE: 110 MMHG | BODY MASS INDEX: 25.62 KG/M2 | HEART RATE: 60 BPM | WEIGHT: 179 LBS | DIASTOLIC BLOOD PRESSURE: 60 MMHG | HEIGHT: 70 IN

## 2022-06-27 DIAGNOSIS — E03.9 HYPOTHYROIDISM, UNSPECIFIED TYPE: Primary | ICD-10-CM

## 2022-06-27 PROCEDURE — 99213 OFFICE O/P EST LOW 20 MIN: CPT | Performed by: INTERNAL MEDICINE

## 2022-06-27 NOTE — PROGRESS NOTES
"Chief Complaint   Patient presents with   • Hypothyroidism        HPI   Mitra García is a 47 y.o. female had concerns including Hypothyroidism.     Patient reports no significant health changes in the interim since her last visit.  She reports she has been doing generally well.  She has had some weight loss since last visit which is intentional.  She does report some intermittent constipation.  She does report she was surprised when she thought recent labs with elevated TSH.  Of note, she does believe she is taking biotin and may not have stopped this prior to testing.    The following portions of the patient's history were reviewed and updated as appropriate: allergies, current medications and past social history.    Review of Systems   Constitutional: Negative for unexpected weight gain and unexpected weight loss.   Gastrointestinal: Positive for constipation. Negative for diarrhea.   Endocrine: Negative for cold intolerance and heat intolerance.      /60 (BP Location: Right arm, Patient Position: Sitting, Cuff Size: Adult)   Pulse 60   Ht 177.8 cm (70\")   Wt 81.2 kg (179 lb)   SpO2 98%   BMI 25.68 kg/m²      Physical Exam      Constitutional:  well developed; well nourished  no acute distress   ENT/Thyroid: not examined   Eyes: Conjunctiva: clear   Respiratory:  breathing is unlabored  clear to auscultation bilaterally   Cardiovascular:  regular rate and rhythm   Chest:  Not performed.   Abdomen: soft, non-tender; no masses   : Not performed.   Musculoskeletal: Not performed   Skin: dry and warm   Neuro: mental status, speech normal   Psych: mood and affect are within normal limits       Labs/Imaging   Latest Reference Range & Units 06/17/22 08:46   TSH Baseline 0.450 - 4.500 uIU/mL 5.580 (H)   Free T4 0.82 - 1.77 ng/dL 1.30   (H): Data is abnormally high    Diagnoses and all orders for this visit:    1. Hypothyroidism, unspecified type (Primary)  -Currently taking levothyroxine 50 mcg daily, " liothyronine 5 mcg daily  -Recent labs with mild elevation of TSH, normal free T4  -Patient reports mild constipation, otherwise clinically euthyroid  -Patient reports she may not have discontinued biotin prior to recent testing.  She would like to defer changes and repeat labs in 6 weeks before making any adjustments.  -Appropriate administration of thyroid hormone reviewed  -Patient to repeat labs in 6 weeks, she will hold biotin 1 week prior.  -Symptoms of thyroid hormone dysregulation reviewed, patient will contact the clinic in the interim between visits with any concerning symptoms.  -     TSH; Future  -     T4, Free; Future         Return in about 5 months (around 11/27/2022) for Next scheduled follow up. The patient was instructed to contact the clinic with any interval questions or concerns.    Chelo Colmenares MD   Endocrinologist    Dictated Utilizing Dragon Dictation

## 2022-08-10 PROCEDURE — U0004 COV-19 TEST NON-CDC HGH THRU: HCPCS | Performed by: FAMILY MEDICINE

## 2022-08-15 DIAGNOSIS — E03.9 HYPOTHYROIDISM, UNSPECIFIED TYPE: ICD-10-CM

## 2022-08-16 RX ORDER — LIOTHYRONINE SODIUM 5 UG/1
5 TABLET ORAL DAILY
Qty: 90 TABLET | Refills: 1 | Status: SHIPPED | OUTPATIENT
Start: 2022-08-16 | End: 2023-01-03

## 2022-08-20 LAB
T4 FREE SERPL-MCNC: 1.46 NG/DL (ref 0.82–1.77)
TSH SERPL DL<=0.005 MIU/L-ACNC: 4.09 UIU/ML (ref 0.45–4.5)

## 2022-08-22 ENCOUNTER — HOSPITAL ENCOUNTER (OUTPATIENT)
Dept: MAMMOGRAPHY | Facility: HOSPITAL | Age: 47
Discharge: HOME | End: 2022-08-22
Admitting: FAMILY MEDICINE

## 2022-08-22 DIAGNOSIS — Z12.31 SCREENING MAMMOGRAM FOR BREAST CANCER: ICD-10-CM

## 2022-08-22 PROCEDURE — 77067 SCR MAMMO BI INCL CAD: CPT

## 2022-08-22 PROCEDURE — 77063 BREAST TOMOSYNTHESIS BI: CPT

## 2022-08-29 DIAGNOSIS — E03.9 HYPOTHYROIDISM, UNSPECIFIED TYPE: Primary | ICD-10-CM

## 2022-09-08 ENCOUNTER — HOSPITAL ENCOUNTER (OUTPATIENT)
Dept: GENERAL RADIOLOGY | Facility: HOSPITAL | Age: 47
Discharge: HOME | End: 2022-09-08
Admitting: FAMILY MEDICINE

## 2022-09-08 DIAGNOSIS — R10.31 RIGHT LOWER QUADRANT ABDOMINAL PAIN: ICD-10-CM

## 2022-09-08 PROCEDURE — 74022 RADEX COMPL AQT ABD SERIES: CPT

## 2023-01-03 ENCOUNTER — OFFICE VISIT (OUTPATIENT)
Dept: ENDOCRINOLOGY | Facility: CLINIC | Age: 48
End: 2023-01-03
Payer: COMMERCIAL

## 2023-01-03 VITALS
HEART RATE: 60 BPM | OXYGEN SATURATION: 94 % | HEIGHT: 70 IN | SYSTOLIC BLOOD PRESSURE: 126 MMHG | BODY MASS INDEX: 26.48 KG/M2 | WEIGHT: 185 LBS | DIASTOLIC BLOOD PRESSURE: 78 MMHG

## 2023-01-03 DIAGNOSIS — E03.9 HYPOTHYROIDISM, UNSPECIFIED TYPE: ICD-10-CM

## 2023-01-03 PROCEDURE — 99213 OFFICE O/P EST LOW 20 MIN: CPT | Performed by: INTERNAL MEDICINE

## 2023-01-03 RX ORDER — LIOTHYRONINE SODIUM 5 UG/1
10 TABLET ORAL DAILY
Qty: 180 TABLET | Refills: 1 | Status: SHIPPED | OUTPATIENT
Start: 2023-01-03

## 2023-01-03 RX ORDER — LEVOTHYROXINE SODIUM 0.05 MG/1
50 TABLET ORAL DAILY
Qty: 90 TABLET | Refills: 1 | Status: SHIPPED | OUTPATIENT
Start: 2023-01-03

## 2023-06-14 DIAGNOSIS — E03.9 HYPOTHYROIDISM, UNSPECIFIED TYPE: Primary | ICD-10-CM

## 2023-06-15 DIAGNOSIS — E03.9 HYPOTHYROIDISM, UNSPECIFIED TYPE: ICD-10-CM

## 2023-06-15 RX ORDER — LIOTHYRONINE SODIUM 5 UG/1
10 TABLET ORAL DAILY
Qty: 180 TABLET | Refills: 1 | Status: SHIPPED | OUTPATIENT
Start: 2023-06-15

## 2023-06-15 RX ORDER — LEVOTHYROXINE SODIUM 0.05 MG/1
50 TABLET ORAL DAILY
Qty: 90 TABLET | Refills: 1 | Status: SHIPPED | OUTPATIENT
Start: 2023-06-15

## 2023-11-09 ENCOUNTER — OFFICE VISIT (OUTPATIENT)
Dept: ENDOCRINOLOGY | Facility: CLINIC | Age: 48
End: 2023-11-09
Payer: COMMERCIAL

## 2023-11-09 VITALS
HEART RATE: 64 BPM | DIASTOLIC BLOOD PRESSURE: 80 MMHG | BODY MASS INDEX: 26.63 KG/M2 | WEIGHT: 186 LBS | OXYGEN SATURATION: 96 % | HEIGHT: 70 IN | SYSTOLIC BLOOD PRESSURE: 130 MMHG

## 2023-11-09 DIAGNOSIS — E03.9 HYPOTHYROIDISM, UNSPECIFIED TYPE: Primary | ICD-10-CM

## 2023-11-09 PROCEDURE — 99213 OFFICE O/P EST LOW 20 MIN: CPT | Performed by: INTERNAL MEDICINE

## 2023-11-09 RX ORDER — FERROUS SULFATE 325(65) MG
325 TABLET ORAL
COMMUNITY

## 2023-11-09 RX ORDER — MAGNESIUM OXIDE 400 MG/1
400 TABLET ORAL DAILY
COMMUNITY

## 2023-11-09 NOTE — PROGRESS NOTES
"Chief Complaint   Patient presents with    Hypothyroidism        HPI   Mitra García is a 48 y.o. female had concerns including Hypothyroidism.      Patient is currently taking Levothyroxine 50 mcg daily, liothyronine 10 mcg daily. She denies missed doses. She does report that she did labs in June but did not forward these for review. Patient does report menses are becoming more irregular and she is taking a supplement for menopausal symptoms. Constipation is intermittent and somewhat worsened since starting iron.     The following portions of the patient's history were reviewed and updated as appropriate: allergies, current medications, and past social history.    Review of Systems   Constitutional:  Positive for diaphoresis.   Gastrointestinal:  Positive for constipation.   Endocrine: Positive for heat intolerance.          /80 (BP Location: Right arm, Patient Position: Sitting, Cuff Size: Adult)   Pulse 64   Ht 177.8 cm (70\")   Wt 84.4 kg (186 lb)   SpO2 96%   BMI 26.69 kg/m²      Physical Exam      Constitutional:  well developed; well nourished  no acute distress   ENT/Thyroid: not examined   Eyes: Conjunctiva: clear   Respiratory:  breathing is unlabored  clear to auscultation bilaterally   Cardiovascular:  regular rate and rhythm   Chest:  Not performed.   Abdomen: Not performed.   : Not performed.   Musculoskeletal: Not performed   Skin: not performed.   Neuro: mental status, speech normal   Psych: mood and affect are within normal limits       Labs/Imaging   Latest Reference Range & Units 08/19/22 08:13   TSH Baseline 0.450 - 4.500 uIU/mL 4.090   Free T4 0.82 - 1.77 ng/dL 1.46     Labs dated 6/16/23  TSH 1.98  Free T4 1.22    Diagnoses and all orders for this visit:    1. Hypothyroidism, unspecified type (Primary)  -     TSH; Future  -     T4, Free; Future  -     T3, Free; Future    Currently taking levothyroxine 50 mcg daily, liothyronine 10 mcg daily  Labs in June 2023 were within normal " range  Patient has symptomatic concerns including constipation, heat intolerance.  Reviewed that these could be explained easily by recent addition of supplements (iron) and perimenopausal state.  We will plan to repeat labs in December, 6 months from prior, to ensure stability but will not make changes to medications today.  Patient will contact clinic in the interim between visits with any concerning clinical changes.    Return in about 1 year (around 11/9/2024) for Next scheduled follow up. The patient was instructed to contact the clinic with any interval questions or concerns.    Chelo Colmenares MD   Endocrinologist    Dictated Utilizing Dragon Dictation

## 2023-12-06 DIAGNOSIS — E03.9 HYPOTHYROIDISM, UNSPECIFIED TYPE: ICD-10-CM

## 2023-12-19 DIAGNOSIS — E03.9 HYPOTHYROIDISM, UNSPECIFIED TYPE: ICD-10-CM

## 2023-12-19 RX ORDER — LIOTHYRONINE SODIUM 5 UG/1
10 TABLET ORAL DAILY
Qty: 180 TABLET | Refills: 3 | Status: SHIPPED | OUTPATIENT
Start: 2023-12-19

## 2023-12-19 NOTE — TELEPHONE ENCOUNTER
Rx Refill Note    Requested Prescriptions     Pending Prescriptions Disp Refills    liothyronine (CYTOMEL) 5 MCG tablet [Pharmacy Med Name: LIOTHYRONINE SOD 5 MCG TAB] 180 tablet 1     Sig: TAKE 2 TABLETS BY MOUTH DAILY.        Last office visit with prescribing clinician: 11/9/2023       Next office visit with prescribing clinician: 11/12/2024     {    Yamel Kurtz MA  12/19/23, 07:41 EST

## 2024-01-03 DIAGNOSIS — E03.9 HYPOTHYROIDISM, UNSPECIFIED TYPE: ICD-10-CM

## 2024-01-03 RX ORDER — LEVOTHYROXINE SODIUM 0.05 MG/1
50 TABLET ORAL DAILY
Qty: 90 TABLET | Refills: 1 | Status: SHIPPED | OUTPATIENT
Start: 2024-01-03

## 2024-01-03 NOTE — TELEPHONE ENCOUNTER
Rx Refill Note    Requested Prescriptions     Pending Prescriptions Disp Refills    levothyroxine (SYNTHROID, LEVOTHROID) 50 MCG tablet [Pharmacy Med Name: LEVOTHYROXINE 50 MCG TABLET] 90 tablet 1     Sig: TAKE 1 TABLET BY MOUTH EVERY DAY        Last office visit with prescribing clinician: 11/9/2023       Next office visit with prescribing clinician: 11/12/2024     {    Yamel Kurtz MA  01/03/24, 07:43 EST

## 2024-02-06 ENCOUNTER — TELEPHONE (OUTPATIENT)
Dept: URGENT CARE | Facility: CLINIC | Age: 49
End: 2024-02-06

## 2024-02-06 NOTE — TELEPHONE ENCOUNTER
Please call patient.  Your COVID 19 Joyce test was negative.  Will treat as if influenza with Tamiflu.  I have sent the Tamiflu to your pharmacy.

## 2024-02-15 ENCOUNTER — TRANSCRIBE ORDERS (OUTPATIENT)
Dept: ADMINISTRATIVE | Facility: HOSPITAL | Age: 49
End: 2024-02-15
Payer: COMMERCIAL

## 2024-02-15 DIAGNOSIS — Z12.31 SCREENING MAMMOGRAM FOR BREAST CANCER: Primary | ICD-10-CM

## 2024-05-24 ENCOUNTER — HOSPITAL ENCOUNTER (OUTPATIENT)
Dept: MAMMOGRAPHY | Facility: HOSPITAL | Age: 49
Discharge: HOME | End: 2024-05-24
Admitting: FAMILY MEDICINE
Payer: COMMERCIAL

## 2024-05-24 DIAGNOSIS — Z12.31 SCREENING MAMMOGRAM FOR BREAST CANCER: ICD-10-CM

## 2024-05-24 PROCEDURE — 77067 SCR MAMMO BI INCL CAD: CPT

## 2024-05-24 PROCEDURE — 77063 BREAST TOMOSYNTHESIS BI: CPT

## 2024-06-26 DIAGNOSIS — E03.9 HYPOTHYROIDISM, UNSPECIFIED TYPE: ICD-10-CM

## 2024-06-26 RX ORDER — LEVOTHYROXINE SODIUM 0.05 MG/1
50 TABLET ORAL DAILY
Qty: 90 TABLET | Refills: 1 | Status: SHIPPED | OUTPATIENT
Start: 2024-06-26

## 2024-06-26 NOTE — TELEPHONE ENCOUNTER
Rx Refill Note  Requested Prescriptions     Pending Prescriptions Disp Refills    levothyroxine (SYNTHROID, LEVOTHROID) 50 MCG tablet [Pharmacy Med Name: LEVOTHYROXINE 50 MCG TABLET] 90 tablet 1     Sig: TAKE 1 TABLET BY MOUTH EVERY DAY      Last office visit with prescribing clinician: 11/9/2023     Next office visit with prescribing clinician: 11/12/2024                           Dana Lopez MA  06/26/24, 11:23 EDT

## 2024-07-24 ENCOUNTER — TRANSCRIBE ORDERS (OUTPATIENT)
Dept: ADMINISTRATIVE | Facility: HOSPITAL | Age: 49
End: 2024-07-24
Payer: COMMERCIAL

## 2024-07-24 DIAGNOSIS — N92.1 METRORRHAGIA: Primary | ICD-10-CM

## 2024-07-31 DIAGNOSIS — E03.9 HYPOTHYROIDISM, UNSPECIFIED TYPE: ICD-10-CM

## 2024-07-31 RX ORDER — LEVOTHYROXINE SODIUM 0.05 MG/1
50 TABLET ORAL DAILY
Qty: 3 TABLET | Refills: 0 | Status: SHIPPED | OUTPATIENT
Start: 2024-07-31

## 2024-07-31 NOTE — TELEPHONE ENCOUNTER
Last office visit with prescribing clinician: 11/9/2023       Next office visit with prescribing clinician: 11/12/2024     {

## 2024-07-31 NOTE — TELEPHONE ENCOUNTER
PATIENT ACCIDENTALLY LEFT HER LEVOTHYROXINE BOTTLE AT HER FARM WHICH IS A [address] AND WILL NOT BE BACK TO THE FARM TILL THIS WEEKEND. SHE NEEDS AT LEAST 3 DAYS OF LEVOTHYROXINE CALLED INTO HER PHARMACY TILL SHE CAN GET HER BOTTLE AT THE FARM. PATIENTS NUMBER [phone]

## 2024-08-23 ENCOUNTER — HOSPITAL ENCOUNTER (OUTPATIENT)
Dept: ULTRASOUND IMAGING | Facility: HOSPITAL | Age: 49
Discharge: HOME | End: 2024-08-23
Admitting: FAMILY MEDICINE
Payer: COMMERCIAL

## 2024-08-23 DIAGNOSIS — N92.1 METRORRHAGIA: ICD-10-CM

## 2024-08-23 PROCEDURE — 76830 TRANSVAGINAL US NON-OB: CPT

## 2024-11-12 ENCOUNTER — OFFICE VISIT (OUTPATIENT)
Dept: ENDOCRINOLOGY | Facility: CLINIC | Age: 49
End: 2024-11-12
Payer: COMMERCIAL

## 2024-11-12 VITALS
DIASTOLIC BLOOD PRESSURE: 72 MMHG | BODY MASS INDEX: 26.45 KG/M2 | SYSTOLIC BLOOD PRESSURE: 118 MMHG | HEIGHT: 70 IN | WEIGHT: 184.8 LBS | OXYGEN SATURATION: 97 % | HEART RATE: 86 BPM

## 2024-11-12 DIAGNOSIS — E03.9 HYPOTHYROIDISM, UNSPECIFIED TYPE: Primary | ICD-10-CM

## 2024-11-12 PROCEDURE — 99213 OFFICE O/P EST LOW 20 MIN: CPT | Performed by: INTERNAL MEDICINE

## 2024-11-12 RX ORDER — LEVOTHYROXINE SODIUM 50 UG/1
50 TABLET ORAL DAILY
Qty: 90 TABLET | Refills: 3 | Status: SHIPPED | OUTPATIENT
Start: 2024-11-12

## 2024-11-12 RX ORDER — LIOTHYRONINE SODIUM 5 UG/1
10 TABLET ORAL DAILY
Qty: 180 TABLET | Refills: 3 | Status: SHIPPED | OUTPATIENT
Start: 2024-11-12

## 2024-11-12 NOTE — PROGRESS NOTES
"Chief Complaint   Patient presents with    Hypothyroidism        HPI   Mitra García is a 49 y.o. female had concerns including Hypothyroidism.      Patient reports that she was hospitalized due to accidental poisoning with bug spray in September. She did miss cytomel for a few days during hospitalization. She has also had knee surgery since last visit.    Patient is currently taking levothyroxine 50 mcg daily, liothyronine 10 mcg daily.  She denies adverse effect.  She completed labs prior to visit which were reviewed.    The following portions of the patient's history were reviewed and updated as appropriate: allergies, current medications, and past social history.    Review of Systems   Constitutional:  Negative for unexpected weight gain and unexpected weight loss.   Cardiovascular:  Negative for palpitations.      /72   Pulse 86   Ht 177.8 cm (70\")   Wt 83.8 kg (184 lb 12.8 oz)   SpO2 97%   BMI 26.52 kg/m²      Physical Exam      Constitutional:  well developed; well nourished  no acute distress   ENT/Thyroid: not examined   Eyes: Conjunctiva: clear   Respiratory:  breathing is unlabored  clear to auscultation bilaterally   Cardiovascular:  regular rate and rhythm   Chest:  Not performed.   Abdomen: Not performed.   : Not performed.   Musculoskeletal: Not performed   Skin: not performed.   Neuro: mental status, speech normal   Psych: mood and affect are within normal limits       Labs/Imaging  Labs dated 10/4/2024  TSH 4.25  Free T4 1.27    Diagnoses and all orders for this visit:    1. Hypothyroidism, unspecified type (Primary)  -     TSH; Future  -     T4, Free; Future  -     liothyronine (CYTOMEL) 5 MCG tablet; Take 2 tablets by mouth Daily.  Dispense: 180 tablet; Refill: 3  -     levothyroxine (SYNTHROID, LEVOTHROID) 50 MCG tablet; Take 1 tablet by mouth Daily.  Dispense: 90 tablet; Refill: 3  Recent labs were reviewed.  TSH and free T4 were normal in October 2024.  Relative change in TSH " in comparison to prior testing is likely due to missed doses of Cytomel during hospitalization.  Patient is clinically euthyroid  Discussed symptoms of thyroid hormone abnormalities.  Plan to continue current thyroid hormone replacement.  Patient will contact the clinic in the interim between visits with any concerning symptomatic changes.       Return in about 1 year (around 11/12/2025) for Next scheduled follow up. The patient was instructed to contact the clinic with any interval questions or concerns.    Electronically signed by: Chelo Colmenares MD   Endocrinologist    Dictated Utilizing Dragon Dictation

## 2025-07-14 ENCOUNTER — TRANSCRIBE ORDERS (OUTPATIENT)
Dept: ADMINISTRATIVE | Facility: HOSPITAL | Age: 50
End: 2025-07-14
Payer: COMMERCIAL

## 2025-07-14 DIAGNOSIS — Z12.31 SCREENING MAMMOGRAM FOR BREAST CANCER: Primary | ICD-10-CM
